# Patient Record
(demographics unavailable — no encounter records)

---

## 2024-12-05 NOTE — DISCUSSION/SUMMARY
[Reviewed Clinical Lab Test(s)] : Results of clinical tests were reviewed. [Reviewed Radiology Report(s)] : Radiology reports were reviewed. [FreeTextEntry1] : Risk, signs, symptoms of recurrence reviewed. HM reviewed.  Surveillance scheduled discussed - f/u 12 months, alternating q6m with Dr. Sprague.  All questions answered.

## 2024-12-05 NOTE — PHYSICAL EXAM
[Chaperone Present] : A chaperone was present in the examining room during all aspects of the physical examination [22819] : A chaperone was present during the pelvic exam. [FreeTextEntry2] : Moraima [Absent] : Adnexa(ae): Absent [Normal] : Recto-Vaginal Exam: Normal [de-identified] : well-healed [de-identified] : No lesions or nodules.  [de-identified] : cuff intact, no lesions; no sign of any recent blood in vault [de-identified] : no mass or tenderness [de-identified] : normal sphincter tone, smooth rectovaginal septum, no cul-de-sac nodules. [Fully active, able to carry on all pre-disease performance without restriction] : Status 0 - Fully active, able to carry on all pre-disease performance without restriction +fatigue

## 2024-12-05 NOTE — HISTORY OF PRESENT ILLNESS
[FreeTextEntry1] : Ms. Arnold has a h/o stage IA FIGO grade 1 EMC and is s/p robotic TLH/BSO/SLND on 11/2/18. Final pathology demonstrated endometrial endometrioid adenocarcinoma FIGO grade 1, no LVSI, no myometrial invasion, and negative lymph nodes. Benign peritoneal cytology. Final pathology demonstrated endometrial endometrioid adenocarcinoma FIGO grade 1, no LVSI, no myometrial invasion, and negative lymph nodes. Benign peritoneal cytology.  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 She underwent genetic counseling and testing, which revealed three VUS, no known pathogenic mutations. She does not have known Boone syndrome.   9/20/21:  She noticed SOB over the summer and was found to have CAD. s/p CABG x 3  ([facility] - Dr. Branden Leger)  8/18/23- CT AP: No evidence of recurrent or metastatic disease. Mild diffuse bladder wall thickening. Was treated for a UTI.   She feels well and denies any VB or other associated signs or symptoms.  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   HM: Pap- 9/17/2018- Negative; now n/a Mammogram-/sono:  6/2024 BIRADS 1 Colonoscopy- 5/7/18- Negative, endoscopy with [facility] 2021 ; has followup planned.  Referred by (GYN) Dr. Sprague PCP: Dr. Boyd Cardiologist: Dr. Chambers GI: Dr. George Pulmonologist: Dr. Galan Endocrinologist: Dr. Villanueva

## 2024-12-05 NOTE — LETTER BODY
[Dear  ___] : Dear  [unfilled], [I recently saw our patient [unfilled] for a follow-up visit.] : I recently saw our patient, [unfilled] for a follow-up visit. [Attached please find my note.] : Attached please find my note. [FreeTextEntry2] : She is clinically without evidence of disease. She will see me in 12 months for surveillance. I will keep you updated on her progress. Thank you again for referring this mahogany patient.

## 2024-12-05 NOTE — ASSESSMENT
[FreeTextEntry1] : 76y/o with h/o stage IA FIGO grade 1 endometrial cancer, clinically without evidence of disease.

## 2025-01-06 NOTE — DISCUSSION/SUMMARY
[FreeTextEntry1] : Bashir current meds. Contin diet and exercise. Check labs follow up 6 mo  [EKG obtained to assist in diagnosis and management of assessed problem(s)] : EKG obtained to assist in diagnosis and management of assessed problem(s)

## 2025-01-06 NOTE — HISTORY OF PRESENT ILLNESS
[FreeTextEntry1] : Here for routine follow up. No new complaints. Feels well but tired. No CP, SOB, dizziness.

## 2025-06-23 NOTE — HISTORY OF PRESENT ILLNESS
[FreeTextEntry1] : Here for follow up. Under increased stress dealing with her 's recovery from surgery. Not exercising much. Compl of some VIEIRA up stairs which is fairly chronic but may be more pronounced.  Otherwise feels well.  No CP or dizziness. Not checking BP at home.

## 2025-06-23 NOTE — DISCUSSION/SUMMARY
[FreeTextEntry1] : Stable cardiac status. Contin current meds Encouraged resuming regular exercise. Call if symptoms change or worsen. Follow up with PCP with labs as planned 6 mo